# Patient Record
Sex: FEMALE | Race: WHITE | NOT HISPANIC OR LATINO | Employment: UNEMPLOYED | URBAN - METROPOLITAN AREA
[De-identification: names, ages, dates, MRNs, and addresses within clinical notes are randomized per-mention and may not be internally consistent; named-entity substitution may affect disease eponyms.]

---

## 2024-10-28 DIAGNOSIS — M75.112 INCOMPLETE ROTATOR CUFF TEAR OR RUPTURE OF LEFT SHOULDER, NOT SPECIFIED AS TRAUMATIC: Primary | ICD-10-CM

## 2024-11-11 ENCOUNTER — OFFICE VISIT (OUTPATIENT)
Dept: ORTHOPEDICS | Facility: CLINIC | Age: 64
End: 2024-11-11
Payer: MEDICAID

## 2024-11-11 VITALS
DIASTOLIC BLOOD PRESSURE: 75 MMHG | SYSTOLIC BLOOD PRESSURE: 112 MMHG | TEMPERATURE: 98 F | OXYGEN SATURATION: 96 % | RESPIRATION RATE: 20 BRPM | BODY MASS INDEX: 53.92 KG/M2 | HEART RATE: 101 BPM | WEIGHT: 293 LBS | HEIGHT: 62 IN

## 2024-11-11 DIAGNOSIS — S46.001A INJURY OF RIGHT ROTATOR CUFF, INITIAL ENCOUNTER: Primary | ICD-10-CM

## 2024-11-11 PROCEDURE — 3078F DIAST BP <80 MM HG: CPT | Mod: CPTII,,, | Performed by: SPECIALIST

## 2024-11-11 PROCEDURE — 99203 OFFICE O/P NEW LOW 30 MIN: CPT | Mod: S$PBB,,, | Performed by: SPECIALIST

## 2024-11-11 PROCEDURE — 20611 DRAIN/INJ JOINT/BURSA W/US: CPT | Mod: PBBFAC | Performed by: SURGERY

## 2024-11-11 PROCEDURE — 99213 OFFICE O/P EST LOW 20 MIN: CPT | Mod: PBBFAC,25

## 2024-11-11 PROCEDURE — 4010F ACE/ARB THERAPY RXD/TAKEN: CPT | Mod: CPTII,,, | Performed by: SPECIALIST

## 2024-11-11 PROCEDURE — 1159F MED LIST DOCD IN RCRD: CPT | Mod: CPTII,,, | Performed by: SPECIALIST

## 2024-11-11 PROCEDURE — 3008F BODY MASS INDEX DOCD: CPT | Mod: CPTII,,, | Performed by: SPECIALIST

## 2024-11-11 PROCEDURE — 3074F SYST BP LT 130 MM HG: CPT | Mod: CPTII,,, | Performed by: SPECIALIST

## 2024-11-11 RX ORDER — MELOXICAM 15 MG/1
15 TABLET ORAL EVERY MORNING
COMMUNITY

## 2024-11-11 RX ORDER — ROPINIROLE 1 MG/1
TABLET, FILM COATED ORAL
COMMUNITY
Start: 2023-11-01

## 2024-11-11 RX ORDER — LISINOPRIL 20 MG/1
20 TABLET ORAL
COMMUNITY

## 2024-11-11 RX ORDER — TRIAMCINOLONE ACETONIDE 40 MG/ML
40 INJECTION, SUSPENSION INTRA-ARTICULAR; INTRAMUSCULAR
Status: COMPLETED | OUTPATIENT
Start: 2024-11-11 | End: 2024-11-11

## 2024-11-11 RX ORDER — FUROSEMIDE 20 MG/1
TABLET ORAL
COMMUNITY

## 2024-11-11 RX ORDER — LIDOCAINE HYDROCHLORIDE 10 MG/ML
5 INJECTION, SOLUTION EPIDURAL; INFILTRATION; INTRACAUDAL; PERINEURAL
Status: COMPLETED | OUTPATIENT
Start: 2024-11-11 | End: 2024-11-11

## 2024-11-11 RX ORDER — GABAPENTIN 300 MG/1
CAPSULE ORAL
COMMUNITY
Start: 2024-10-17

## 2024-11-11 RX ADMIN — LIDOCAINE HYDROCHLORIDE 50 MG: 10 INJECTION, SOLUTION EPIDURAL; INFILTRATION; INTRACAUDAL; PERINEURAL at 03:11

## 2024-11-11 RX ADMIN — TRIAMCINOLONE ACETONIDE 40 MG: 40 INJECTION, SUSPENSION INTRA-ARTICULAR; INTRAMUSCULAR at 03:11

## 2024-11-11 NOTE — PROGRESS NOTES
Large Joint Aspiration/Injection: R subacromial bursa    Date/Time: 11/11/2024 1:50 PM    Performed by: Arun Mejia MD  Authorized by: Arun Mejia MD    Indications:  Pain  Site marked: the procedure site was marked    Timeout: prior to procedure the correct patient, procedure, and site was verified      Local anesthesia used?: Yes    Local anesthetic:  Topical anesthetic    Details:  Needle Size:  22 G  Ultrasonic Guidance for needle placement?: Yes    Images are saved and documented.  Approach:  Posterior  Location:  Shoulder  Site:  R subacromial bursa  Patient tolerance:  Patient tolerated the procedure well with no immediate complications     Staff: Arun Mejia MD    Risks:  Possible complications with the injection include bleeding, infection (.01%), tendon rupture, steroid flare, fat pad or soft tissue atrophy, skin depigmentation, allergic reaction to medications and vasovagal response. (steroid flare treatment is rest, ice, NSAIDs and resolves in 24-36 hours.)    Consent:  No absolute contraindications (cellulitis overlying joint, infection, lack of informed consent, allergy to injection medication, AVN protein or egg allergy for sodium hyaluronate, or history of steroid flare) or relative contraindications (uncontrolled DM2 A1c>10, coagulopathy, INR > 3.5, previous joint replacement or history of AVN).        Description:  The patient was prepped in normal sterile fashion use of chlorhexidine scrub and the appropriate and anatomic landmarks were identified with ultrasound.  Contents of syringe included: 5cc of 1% of lidocaine with 40mg of Kenalog     Post Procedure: Patient alert, and moving all extremities. ROM improved, pain decreased.  Good peripheral pulses, no signs of vascular compromise and range of motion intact.  Aftercare instructions were given to patient at time of discharge.  Relative rest for 3 days-avoiding excess activity.  Place ice on the area for 15 minutes every 4-6 hours.  Patient may take Tylenol a 1000 mg b.i.d. or ibuprofen 600 mg t.i.d. for the next 3-4 days if not on medication already and safe to take pending co-morbidities.  Protect the area for the next 1-8 hours if anesthetic was used.  Avoid excessive activity for the next 3-4 weeks.  ER precautions given for fever, severe joint pain or allergic reaction or other new symptoms related to the joint injection.

## 2024-11-11 NOTE — PROGRESS NOTES
[facility]  Established Patient Office Visit  11/11/2024       Patient ID: Wendy Witt  YOB: 1960  MRN: [id number]    Chief Complaint: Pain of the Right Shoulder (Right shoulder pain x 6 months no injury pain 6/10 takes Mobic daily with no relief)      HPI:  Wendy Witt is a 64 y.o. female with past medical history of hypertension, morbid obesity who was referred to clinic today for right rotator cuff tear.  She reports right shoulder pain started about 6 months ago.  She denies any specific injury or fall.  Since onset of pain she has had difficulty with overhead tasks and continued pain to her right shoulder.  She did complete a course of physical therapy with some improvement.  She is right-hand dominant.  She denies any numbness or tingling to the right upper extremity.    12 point ROS performed and negative except as above.     Past Medical History:    History reviewed. No pertinent past medical history.  Past Surgical History:   Procedure Laterality Date    HYSTERECTOMY  1984    Still have ovaries    JOINT REPLACEMENT  2016    Double knee replacement    TONSILLECTOMY  1970     Family History   Problem Relation Name Age of Onset    Early death Mother Nithya lyn      Social History     Socioeconomic History    Marital status:    Tobacco Use    Smoking status: Never    Smokeless tobacco: Never   Substance and Sexual Activity    Alcohol use: Not Currently     Alcohol/week: 1.0 - 2.0 standard drink of alcohol     Types: 1 - 2 Glasses of wine per week     Comment: Drink maybe every 6 months    Drug use: Never    Sexual activity: Not Currently     Partners: Male     Birth control/protection: Abstinence     Medication List with Changes/Refills   Current Medications    ASPIRIN 81 MG CAP    Take 1 capsule every day by oral route.    FUROSEMIDE (LASIX) 20 MG TABLET    Take 2 tablets every day by oral route in the morning.    GABAPENTIN (NEURONTIN) 300 MG  [FreeTextEntry1] : His examination is unchanged.  He is emptying his bladder well.  There is no flank pain.  Continue with tamsulosin after dinner.  He will undergo follow-up renal ultrasound and then we will discuss the results on the phone.  PSA level will be checked.  Pending results, he will follow-up in 1 year.  David Tran MD, FACS [facility]  of Urology [address], [address] [place] [address] Tel: [phone] Fax: [phone] CAPSULE    Take 2 capsules every day by oral route at bedtime for 30 days.    LISINOPRIL (PRINIVIL,ZESTRIL) 20 MG TABLET    Take 20 mg by mouth.    MELOXICAM (MOBIC) 15 MG TABLET    Take 15 mg by mouth every morning.    ROPINIROLE (REQUIP) 1 MG TABLET    Take 1 tablet every day by oral route at bedtime.     Review of patient's allergies indicates:  No Known Allergies    Physical Exam:  RUE  No abrasions or open wounds about the shoulder or hand  NonTTP AC joint, TTP bicipital groove  ROM:   Abd 120 active, 145 passive   active, 145 passive  ER 45, IR to L5  5/5 biceps, triceps, IR  4+/5  external rotation, deltoid  + Crossbody, + figueredo  - Speeds  + Jobes, + Bear hugger  Motor intact AIN/PIN/ulnar  SILT M/U/R  Hand wwp, radial 2+    Imaging independently interpreted:  MRI right shoulder demonstrates full-thickness supraspinatus and infraspinatus tear with retraction.  Mild glenohumeral arthritis demonstrated.    Assessment and Plan:    Wendy Witt is a 64 y.o. female with  right shoulder rotator cuff tear (atraumatic).    -given she had some improvement with physical therapy recommend restarting home exercises to work on rotator cuff strengthening   -discussed treatment options with the patient given her elevated BMI and associated risks of surgical intervention, we will plan to proceed with nonoperative management at this time.  -right shoulder subacromial injection performed by sports Medicine attending today in clinic.  -follow up in 3 months for repeat evaluation      Aileen Brito MD PGY-3  [facility]           Orders Placed This Encounter    LIDOcaine (PF) 10 mg/ml (1%) injection 50 mg    triamcinolone acetonide injection 40 mg

## 2024-11-12 NOTE — PROGRESS NOTES
Faculty Attestation: Wendy Witt  was seen at [facility] in the Orthopaedic Clinic. Discussed with the resident at the time of the visit. History of Present Illness, Physical Exam, and Assessment and Plan reviewed. Treatment plan is reasonable and appropriate. Compliance with treatment recommendations is important. No procedure was performed.     Cezar Rolle MD  Orthopaedic Surgery

## 2025-02-19 ENCOUNTER — OFFICE VISIT (OUTPATIENT)
Dept: ORTHOPEDICS | Facility: CLINIC | Age: 65
End: 2025-02-19
Payer: MEDICAID

## 2025-02-19 VITALS
SYSTOLIC BLOOD PRESSURE: 138 MMHG | HEIGHT: 62 IN | TEMPERATURE: 98 F | OXYGEN SATURATION: 97 % | BODY MASS INDEX: 53.92 KG/M2 | HEART RATE: 91 BPM | WEIGHT: 293 LBS | DIASTOLIC BLOOD PRESSURE: 85 MMHG | RESPIRATION RATE: 20 BRPM

## 2025-02-19 DIAGNOSIS — S46.001A INJURY OF RIGHT ROTATOR CUFF, INITIAL ENCOUNTER: Primary | ICD-10-CM

## 2025-02-19 PROCEDURE — 20610 DRAIN/INJ JOINT/BURSA W/O US: CPT | Mod: PBBFAC,RT

## 2025-02-19 PROCEDURE — 99213 OFFICE O/P EST LOW 20 MIN: CPT | Mod: PBBFAC

## 2025-02-19 RX ORDER — TRIAMCINOLONE ACETONIDE 40 MG/ML
40 INJECTION, SUSPENSION INTRA-ARTICULAR; INTRAMUSCULAR ONCE
Status: COMPLETED | OUTPATIENT
Start: 2025-02-19 | End: 2025-02-19

## 2025-02-19 RX ORDER — LIDOCAINE HYDROCHLORIDE 10 MG/ML
5 INJECTION, SOLUTION EPIDURAL; INFILTRATION; INTRACAUDAL; PERINEURAL
Status: COMPLETED | OUTPATIENT
Start: 2025-02-19 | End: 2025-02-19

## 2025-02-19 RX ADMIN — TRIAMCINOLONE ACETONIDE 40 MG: 40 INJECTION, SUSPENSION INTRA-ARTICULAR; INTRAMUSCULAR at 10:02

## 2025-02-19 RX ADMIN — LIDOCAINE HYDROCHLORIDE 50 MG: 10 INJECTION, SOLUTION EPIDURAL; INFILTRATION; INTRACAUDAL; PERINEURAL at 10:02

## 2025-02-19 NOTE — PROGRESS NOTES
Large Joint Aspiration/Injection: R subacromial bursa    Date/Time: 2/19/2025 9:50 AM    Performed by: Laury Lopez MD  Authorized by: Laury Lopez MD    Consent Done?:  Yes (Written)  Indications:  Pain    Local anesthesia used?: Yes    Local anesthetic:  Topical anesthetic    Details:  Needle Size:  21 G  Approach:  Posterior  Location:  Shoulder  Site:  R subacromial bursa  Patient tolerance:  Patient tolerated the procedure well with no immediate complications      Staff Attending: Arun Mejia MD    Risks:  Possible complications with the injection include bleeding, infection (.01%), tendon rupture, steroid flare, fat pad or soft tissue atrophy, skin depigmentation, allergic reaction to medications and vasovagal response. (steroid flare treatment is rest, ice, NSAIDs and resolves in 24-36 hours.)    Consent:  No absolute contraindications (cellulitis overlying joint, infection, lack of informed consent, allergy to injection medication, AVN protein or egg allergy for sodium hyaluronate, or history of steroid flare) or relative contraindications (uncontrolled DM2 A1c>10, coagulopathy, INR > 3.5, previous joint replacement or history of AVN).        Description:  The patient was prepped in normal sterile fashion use of chlorhexidine scrub and the appropriate and anatomic landmarks were identified with ultrasound as image guidance. The patient was evaluated with a [employer] ultrasound machine using a 10 MHz linear probe, following a standard protocol. Ultrasound imaging confirmed placement of the needle in the correct position, with reference to surrounding anatomic structures. Dynamic visualization of the needle was continuous throughout the procedure(s) and maintained good position. Care was taken to ensure there was unrestricted flow of syringe contents (listed below) into the site of injection. The ultrasound image for needle placement was captured and saved in the patient's medical record.         Indication for use of Ultrasound Guidance (elevated BMI): The indication for the use of ultrasound was to ensure accurate injection due to soft tissue plethora, and to ensure accurate localization of needle for aspiration and/or injection, and minimize risk of damage to surrounding structures. Isamar EL, Mariah S, Juan Luis LJ, Jeff BJ. Improving injection accuracy of the elbow, knee, and shoulder: does injection site and imaging make a difference? A systematic review. Am J Sports Med. 2011 Mar;39(3):656-62. doi: 10.1177/4768405706330757. Epub 2011 Jan 21. PMID: 65754629.    Body mass index is 59.68 kg/m².    Contents of syringe included: 5mL of 1% of lidocaine with 40mg of Kenalog (1mL of 40mg/mL)    Post Procedure: Patient alert, and moving all extremities. ROM improved, pain decreased.  Good peripheral pulses, no signs of vascular compromise and range of motion intact.  Aftercare instructions were given to patient at time of discharge.  Relative rest for 3 days-avoiding excess activity.  Place ice on the area for 15 minutes every 4-6 hours. Patient may take Tylenol a 1000 mg b.i.d. or ibuprofen 600 mg t.i.d. for the next 3-4 days if not on medication already and safe to take pending co-morbidities.  Protect the area for the next 1-8 hours if anesthetic was used.  Avoid excessive activity for the next 3-4 weeks.  ER precautions given for fever, severe joint pain or allergic reaction or other new symptoms related to the joint injection.       This note is dictated using the M*Modal Fluency Direct word recognition program. There are word recognition mistakes that are occasionally missed on review.     Laury Lopez MD  Sports Medicine Fellow

## 2025-02-19 NOTE — PROGRESS NOTES
Faculty Attestation: Wendy Witt  was seen at [facility] in the Orthopaedic Clinic in the outpatient department at a [facility] hospital. Discussed with the resident at the time of the visit.  I participated in the management of the patient and was immediately available throughout the encounter. History of Present Illness, Physical Exam, and Assessment and Plan reviewed. Treatment plan is reasonable and appropriate. Compliance with treatment recommendations is important. No procedure was performed.     Aryan Ledesma MD  Orthopedic Surgery Chief

## 2025-02-19 NOTE — PROGRESS NOTES
[facility]  Established Patient Office Visit  02/19/2025       Patient ID: Wendy Witt  YOB: 1960  MRN: [id number]    Chief Complaint: Pain and Follow-up of the Right Shoulder (Follow-up post injection 11/11/24 pain 6/10 uses topicals without any relief, does home exercises)      HPI:  Wendy Witt is a 65 y.o. female with past medical history of hypertension, morbid obesity who was referred to clinic today for right rotator cuff tear.  She was last seen in our clinic about 3 months ago and received a subacromial injection at that time.  Patient states that she had relief of pain for about 2 weeks after the injection.  She takes Mobic and uses a topical anti-inflammatory for pain relief.  She did complete a course of physical therapy in the past with some improvement.  She is right-hand dominant.  She denies any numbness or tingling to the right upper extremity.  Complains of pain with overhead activities.  She states that sometimes the pain is so bad that it causes her to be in tears.    12 point ROS performed and negative except as above.     Past Medical History:    Past Medical History:   Diagnosis Date    Hypertension      Past Surgical History:   Procedure Laterality Date    HYSTERECTOMY  1984    Still have ovaries    JOINT REPLACEMENT  2016    Double knee replacement    TONSILLECTOMY  1970     Family History   Problem Relation Name Age of Onset    Early death Mother Nithya lyn      Social History     Socioeconomic History    Marital status:    Tobacco Use    Smoking status: Never    Smokeless tobacco: Never   Substance and Sexual Activity    Alcohol use: Not Currently     Alcohol/week: 1.0 - 2.0 standard drink of alcohol     Types: 1 - 2 Glasses of wine per week     Comment: Drink maybe every 6 months    Drug use: Never    Sexual activity: Not Currently     Partners: Male     Birth control/protection: Abstinence     Medication List with  Changes/Refills   Current Medications    ASPIRIN 81 MG CAP    Take 1 capsule every day by oral route.    FUROSEMIDE (LASIX) 20 MG TABLET    Take 2 tablets every day by oral route in the morning.    GABAPENTIN (NEURONTIN) 300 MG CAPSULE    Take 2 capsules every day by oral route at bedtime for 30 days.    LISINOPRIL (PRINIVIL,ZESTRIL) 20 MG TABLET    Take 20 mg by mouth.    MELOXICAM (MOBIC) 15 MG TABLET    Take 15 mg by mouth every morning.    ROPINIROLE (REQUIP) 1 MG TABLET    Take 1 tablet every day by oral route at bedtime.     Review of patient's allergies indicates:  No Known Allergies    Physical Exam:  RUE  No abrasions or open wounds about the shoulder or hand  Mild TTP AC joint, TTP bicipital groove  ROM:   Abd 100 active, 145 passive   active, 145 passive  ER 45, IR to L5  5/5 biceps, triceps, IR  4+/5  external rotation, deltoid  + Crossbody, + figueredo  - Speeds  + Jobes  Motor intact AIN/PIN/ulnar  SILT M/U/R  Hand wwp, radial 2+    Imaging independently interpreted:  No new imaging obtained today, previous MRI right shoulder demonstrates full-thickness supraspinatus and infraspinatus tear with retraction.  Mild glenohumeral arthritis demonstrated.    Assessment and Plan:    Wendy Witt is a 65 y.o. female with  right shoulder rotator cuff tear (atraumatic).    - patient would like to try 1 more subacromial injection today as she got a small amount of relief from her last injection  - discussed treatment options with the patient given her elevated BMI and associated risks of surgical intervention, we will plan to proceed with nonoperative management at this time.  - continue with home exercises to work on strengthening the muscles surrounding the rotator cuff  -right shoulder subacromial injection performed by sports Medicine fellow today in clinic.  -follow up in 3 months for repeat evaluation      Leland Edwards MD PGY-3  [facility]opa[facility]